# Patient Record
Sex: MALE | Race: WHITE | Employment: FULL TIME | URBAN - METROPOLITAN AREA
[De-identification: names, ages, dates, MRNs, and addresses within clinical notes are randomized per-mention and may not be internally consistent; named-entity substitution may affect disease eponyms.]

---

## 2018-06-13 ENCOUNTER — HOSPITAL ENCOUNTER (OUTPATIENT)
Dept: LAB | Age: 38
Discharge: HOME | End: 2018-06-13

## 2018-06-13 PROCEDURE — 99001 SPECIMEN HANDLING PT-LAB: CPT | Performed by: PSYCHIATRY & NEUROLOGY

## 2018-07-16 ENCOUNTER — HOSPITAL ENCOUNTER (OUTPATIENT)
Dept: INFUSION THERAPY | Age: 38
End: 2018-07-16

## 2018-07-17 ENCOUNTER — HOSPITAL ENCOUNTER (OUTPATIENT)
Dept: INFUSION THERAPY | Age: 38
End: 2018-07-17
Payer: COMMERCIAL

## 2018-07-24 ENCOUNTER — HOSPITAL ENCOUNTER (OUTPATIENT)
Dept: INFUSION THERAPY | Age: 38
Discharge: HOME | End: 2018-07-24
Payer: COMMERCIAL

## 2018-07-24 VITALS
HEIGHT: 66 IN | WEIGHT: 196.6 LBS | HEART RATE: 55 BPM | RESPIRATION RATE: 18 BRPM | BODY MASS INDEX: 31.6 KG/M2 | SYSTOLIC BLOOD PRESSURE: 109 MMHG | DIASTOLIC BLOOD PRESSURE: 68 MMHG | OXYGEN SATURATION: 99 % | TEMPERATURE: 97.5 F

## 2018-07-24 PROCEDURE — 74011000258 HC RX REV CODE- 258: Performed by: PSYCHIATRY & NEUROLOGY

## 2018-07-24 PROCEDURE — 74011250636 HC RX REV CODE- 250/636: Performed by: PSYCHIATRY & NEUROLOGY

## 2018-07-24 PROCEDURE — 96413 CHEMO IV INFUSION 1 HR: CPT

## 2018-07-24 RX ORDER — SODIUM CHLORIDE 0.9 % (FLUSH) 0.9 %
10 SYRINGE (ML) INJECTION AS NEEDED
Status: DISCONTINUED | OUTPATIENT
Start: 2018-07-24 | End: 2018-07-28 | Stop reason: HOSPADM

## 2018-07-24 RX ADMIN — NATALIZUMAB 300 MG: 300 INJECTION INTRAVENOUS at 09:41

## 2018-07-24 RX ADMIN — Medication 10 ML: at 09:27

## 2018-07-24 RX ADMIN — Medication 10 ML: at 11:04

## 2018-07-24 NOTE — PROGRESS NOTES
[facility] Progress Note    Date: 2018    Name: Nithya Harvey    MRN: [id number]         : 1980      Mr. Lubna Higgins arrived to [facility] at 36. Mr. Lubna Higgins was assessed and education was provided. Written and verbal education provided. Tysabri pre infusion checklist completed and faxed. Mr. Ismael Laguna vitals were reviewed. Visit Vitals    /76 (BP 1 Location: Right arm, BP Patient Position: Sitting)    Pulse 74    Temp 97 °F (36.1 °C)    Resp 18    Ht 5' 6\" (1.676 m)    Wt 89.2 kg (196 lb 9.6 oz)    SpO2 98%    BMI 31.73 kg/m2       24g IV inserted in patient's Right hand  x1 attempt. Positive for blood return/ flushes without difficulty. Tysabri 300 mg administered as ordered followed by NS flush. Mr. Lubna Higgins tolerated well without complaints. IV removed/ intact. Gauze/ coban to site. Mr. Lubna Higgins was discharged from [place] [address] in stable condition at 1110 following a 30 minute observation. No further appointments are scheduled at this office. Patient is advised to follow up with his physician.     Oral Booker RN  2018

## 2019-03-12 ENCOUNTER — HOSPITAL ENCOUNTER (OUTPATIENT)
Dept: LAB | Age: 39
Discharge: HOME | End: 2019-03-12

## 2019-03-12 LAB — XX-LABCORP SPECIMEN COL,LCBCF: NORMAL

## 2019-03-12 PROCEDURE — 99001 SPECIMEN HANDLING PT-LAB: CPT

## 2020-01-13 ENCOUNTER — HOSPITAL ENCOUNTER (OUTPATIENT)
Dept: LAB | Age: 40
Discharge: HOME | End: 2020-01-13

## 2020-01-13 PROCEDURE — 99001 SPECIMEN HANDLING PT-LAB: CPT

## 2020-01-16 LAB — XX-LABCORP SPECIMEN COL,LCBCF: NORMAL

## 2020-07-08 ENCOUNTER — HOSPITAL ENCOUNTER (OUTPATIENT)
Dept: LAB | Age: 40
Discharge: HOME | End: 2020-07-08

## 2020-07-08 LAB — XX-LABCORP SPECIMEN COL,LCBCF: NORMAL

## 2020-07-08 PROCEDURE — 99001 SPECIMEN HANDLING PT-LAB: CPT

## 2021-01-08 ENCOUNTER — HOSPITAL ENCOUNTER (OUTPATIENT)
Dept: LAB | Age: 41
Discharge: HOME | End: 2021-01-08

## 2021-01-08 LAB — XX-LABCORP SPECIMEN COL,LCBCF: NORMAL

## 2021-01-08 PROCEDURE — 99001 SPECIMEN HANDLING PT-LAB: CPT

## 2021-07-16 ENCOUNTER — HOSPITAL ENCOUNTER (OUTPATIENT)
Dept: LAB | Age: 41
Discharge: HOME | End: 2021-07-16

## 2021-07-16 LAB — XX-LABCORP SPECIMEN COL,LCBCF: NORMAL

## 2021-07-16 PROCEDURE — 99001 SPECIMEN HANDLING PT-LAB: CPT

## 2022-02-08 ENCOUNTER — HOSPITAL ENCOUNTER (OUTPATIENT)
Dept: LAB | Age: 42
Discharge: HOME | End: 2022-02-08

## 2022-02-08 LAB — XX-LABCORP SPECIMEN COL,LCBCF: NORMAL

## 2022-02-08 PROCEDURE — 99001 SPECIMEN HANDLING PT-LAB: CPT

## 2022-08-10 ENCOUNTER — HOSPITAL ENCOUNTER (OUTPATIENT)
Dept: LAB | Age: 42
Discharge: HOME | End: 2022-08-10

## 2022-08-10 ENCOUNTER — TRANSCRIBE ORDER (OUTPATIENT)
Dept: REGISTRATION | Age: 42
End: 2022-08-10

## 2022-08-10 DIAGNOSIS — G35 MULTIPLE SCLEROSIS (HCC): Primary | ICD-10-CM

## 2022-08-10 LAB — XX-LABCORP SPECIMEN COL,LCBCF: NORMAL

## 2022-08-10 PROCEDURE — 99001 SPECIMEN HANDLING PT-LAB: CPT
